# Patient Record
Sex: MALE | Race: BLACK OR AFRICAN AMERICAN | NOT HISPANIC OR LATINO | RURAL
[De-identification: names, ages, dates, MRNs, and addresses within clinical notes are randomized per-mention and may not be internally consistent; named-entity substitution may affect disease eponyms.]

---

## 2023-02-20 ENCOUNTER — HOSPITAL ENCOUNTER (EMERGENCY)
Facility: HOSPITAL | Age: 23
Discharge: HOME | End: 2023-02-20
Attending: EMERGENCY MEDICINE
Payer: COMMERCIAL

## 2023-02-20 VITALS
OXYGEN SATURATION: 100 % | HEIGHT: 71 IN | RESPIRATION RATE: 18 BRPM | DIASTOLIC BLOOD PRESSURE: 87 MMHG | TEMPERATURE: 99 F | HEART RATE: 67 BPM | WEIGHT: 230 LBS | BODY MASS INDEX: 32.2 KG/M2 | SYSTOLIC BLOOD PRESSURE: 155 MMHG

## 2023-02-20 DIAGNOSIS — I10 HYPERTENSION, UNSPECIFIED TYPE: ICD-10-CM

## 2023-02-20 DIAGNOSIS — F12.10 MARIJUANA ABUSE: ICD-10-CM

## 2023-02-20 DIAGNOSIS — Z91.148 NONCOMPLIANCE WITH MEDICATION REGIMEN: ICD-10-CM

## 2023-02-20 DIAGNOSIS — R20.2 PARESTHESIA: Primary | ICD-10-CM

## 2023-02-20 PROCEDURE — 99499 UNLISTED E&M SERVICE: CPT | Mod: ,,, | Performed by: EMERGENCY MEDICINE

## 2023-02-20 PROCEDURE — 99282 EMERGENCY DEPT VISIT SF MDM: CPT

## 2023-02-20 PROCEDURE — 99499 NO LOS: ICD-10-PCS | Mod: ,,, | Performed by: EMERGENCY MEDICINE

## 2023-02-20 NOTE — DISCHARGE INSTRUCTIONS
Follow-up in clinic at immediate care clinic or with your primary care provider as soon as possible.  Return to emergency department for any worsening or further problems.

## 2023-02-20 NOTE — ED PROVIDER NOTES
Encounter Date: 2/20/2023       History     Chief Complaint   Patient presents with    Generalized Body Aches     Pt states has been going on since he was younger - pt states bodyachesup and down both arms and both legs - pt states only happens when he lays down to go to sleep     Patient is a 22-year-old male who presents to the emergency department complaining of several year history intermittent pain and tingling in right arm and leg.  Patient states this always happens at night while he was sleeping or in bed.  Symptoms have been somewhat more frequent recently but patient is currently asymptomatic at this time.  No headache, no trauma, no bowel or bladder incontinence.  No acute change in symptoms today.  Patient has history of hypertension but has not been compliant with taking his blood pressure medication and has not seen his primary care provider or any other provider in the last year.  Patient is a smoker and also admits to frequent marijuana use.  Patient denies alcohol use.    Review of patient's allergies indicates:  No Known Allergies  History reviewed. No pertinent past medical history.  History reviewed. No pertinent surgical history.  History reviewed. No pertinent family history.  Social History     Tobacco Use    Smoking status: Every Day     Types: Cigarettes, Cigars    Smokeless tobacco: Never   Substance Use Topics    Alcohol use: Yes    Drug use: Never     Review of Systems    Physical Exam     Initial Vitals [02/20/23 0556]   BP Pulse Resp Temp SpO2   (!) 155/87 67 18 98.8 °F (37.1 °C) 100 %      MAP       --         Physical Exam    Medical Screening Exam   Chief Symptom:  Chief Complaint is Chronic intermittent pain and tingling to right arm and leg for the last 2-3 years.. Chief Complaint HPI is Chronic.  Vital Signs:  ED Triage Vitals [02/20/23 0556]  BP: (!) 155/87  Pulse: 67  Resp: 18  Temp: 98.8 °F (37.1 °C)  SpO2: 100 %    Mental State:  Any evidence of altered mental status?   No  General Appearance:  Well appearing?  Yes  Degree of Pain:  Visual analog pain score less than 3?  Yes  Skin:  Evidence of dehydration or poor perfusion?  No  Focused Physical Examination:  Neurologic exam is normal.  There is no focal weakness or altered sensation.  Cardiovascular exam is regular rate and rhythm without murmurs gallops rubs.  Lungs are clear to auscultation bilaterally.       Ambulatory Status:  Ability to ambulate without difficulty?  Yes    ED Course   Procedures  Labs Reviewed - No data to display       Imaging Results    None          Medications - No data to display                    Clinical Impression:   Final diagnoses:  [R20.2] Paresthesia (Primary)  [I10] Hypertension, unspecified type  [Z91.14] Noncompliance with medication regimen  [F12.10] Marijuana abuse        ED Disposition Condition    Discharge Stable          ED Prescriptions    None       Follow-up Information    None          Osmani Rabago MD  02/20/23 0640

## 2023-02-21 ENCOUNTER — TELEPHONE (OUTPATIENT)
Dept: EMERGENCY MEDICINE | Facility: HOSPITAL | Age: 23
End: 2023-02-21
Payer: COMMERCIAL